# Patient Record
Sex: FEMALE | Race: ASIAN | NOT HISPANIC OR LATINO | Employment: FULL TIME | URBAN - METROPOLITAN AREA
[De-identification: names, ages, dates, MRNs, and addresses within clinical notes are randomized per-mention and may not be internally consistent; named-entity substitution may affect disease eponyms.]

---

## 2022-08-27 ENCOUNTER — APPOINTMENT (OUTPATIENT)
Dept: CT IMAGING | Facility: HOSPITAL | Age: 52
End: 2022-08-27
Attending: EMERGENCY MEDICINE
Payer: COMMERCIAL

## 2022-08-27 ENCOUNTER — HOSPITAL ENCOUNTER (EMERGENCY)
Facility: HOSPITAL | Age: 52
Discharge: HOME | End: 2022-08-27
Attending: EMERGENCY MEDICINE | Admitting: EMERGENCY MEDICINE
Payer: COMMERCIAL

## 2022-08-27 VITALS
HEIGHT: 62 IN | WEIGHT: 150 LBS | BODY MASS INDEX: 27.6 KG/M2 | SYSTOLIC BLOOD PRESSURE: 142 MMHG | DIASTOLIC BLOOD PRESSURE: 87 MMHG | RESPIRATION RATE: 20 BRPM | HEART RATE: 56 BPM | OXYGEN SATURATION: 98 % | TEMPERATURE: 98 F

## 2022-08-27 DIAGNOSIS — R51.9 NONINTRACTABLE HEADACHE, UNSPECIFIED CHRONICITY PATTERN, UNSPECIFIED HEADACHE TYPE: ICD-10-CM

## 2022-08-27 DIAGNOSIS — M62.838 NECK MUSCLE SPASM: ICD-10-CM

## 2022-08-27 LAB
ANION GAP SERPL CALCULATED.3IONS-SCNC: 11 MMOL/L (ref 5–18)
APTT PPP: 25 SECONDS (ref 22–38)
BUN SERPL-MCNC: 17 MG/DL (ref 8–22)
CALCIUM SERPL-MCNC: 9.3 MG/DL (ref 8.5–10.5)
CHLORIDE BLD-SCNC: 110 MMOL/L (ref 98–107)
CO2 SERPL-SCNC: 21 MMOL/L (ref 22–31)
CREAT SERPL-MCNC: 0.75 MG/DL (ref 0.6–1.1)
ERYTHROCYTE [DISTWIDTH] IN BLOOD BY AUTOMATED COUNT: 12.5 % (ref 10–15)
GFR SERPL CREATININE-BSD FRML MDRD: >90 ML/MIN/1.73M2
GLUCOSE BLD-MCNC: 86 MG/DL (ref 70–125)
HCT VFR BLD AUTO: 45 % (ref 35–47)
HGB BLD-MCNC: 15 G/DL (ref 11.7–15.7)
INR PPP: 0.92 (ref 0.85–1.15)
MCH RBC QN AUTO: 31.9 PG (ref 26.5–33)
MCHC RBC AUTO-ENTMCNC: 33.3 G/DL (ref 31.5–36.5)
MCV RBC AUTO: 96 FL (ref 78–100)
PLATELET # BLD AUTO: 218 10E3/UL (ref 150–450)
POTASSIUM BLD-SCNC: 4.1 MMOL/L (ref 3.5–5)
RBC # BLD AUTO: 4.7 10E6/UL (ref 3.8–5.2)
SODIUM SERPL-SCNC: 142 MMOL/L (ref 136–145)
WBC # BLD AUTO: 4.3 10E3/UL (ref 4–11)

## 2022-08-27 PROCEDURE — 85730 THROMBOPLASTIN TIME PARTIAL: CPT | Performed by: EMERGENCY MEDICINE

## 2022-08-27 PROCEDURE — 99285 EMERGENCY DEPT VISIT HI MDM: CPT | Mod: 25

## 2022-08-27 PROCEDURE — 250N000013 HC RX MED GY IP 250 OP 250 PS 637: Performed by: EMERGENCY MEDICINE

## 2022-08-27 PROCEDURE — 70496 CT ANGIOGRAPHY HEAD: CPT

## 2022-08-27 PROCEDURE — 80048 BASIC METABOLIC PNL TOTAL CA: CPT | Performed by: EMERGENCY MEDICINE

## 2022-08-27 PROCEDURE — 36415 COLL VENOUS BLD VENIPUNCTURE: CPT | Performed by: EMERGENCY MEDICINE

## 2022-08-27 PROCEDURE — 85610 PROTHROMBIN TIME: CPT | Performed by: EMERGENCY MEDICINE

## 2022-08-27 PROCEDURE — 96361 HYDRATE IV INFUSION ADD-ON: CPT

## 2022-08-27 PROCEDURE — 258N000003 HC RX IP 258 OP 636: Performed by: EMERGENCY MEDICINE

## 2022-08-27 PROCEDURE — 96375 TX/PRO/DX INJ NEW DRUG ADDON: CPT

## 2022-08-27 PROCEDURE — 70498 CT ANGIOGRAPHY NECK: CPT

## 2022-08-27 PROCEDURE — 96374 THER/PROPH/DIAG INJ IV PUSH: CPT | Mod: 59

## 2022-08-27 PROCEDURE — 85027 COMPLETE CBC AUTOMATED: CPT | Performed by: EMERGENCY MEDICINE

## 2022-08-27 PROCEDURE — 250N000011 HC RX IP 250 OP 636: Performed by: EMERGENCY MEDICINE

## 2022-08-27 RX ORDER — IOPAMIDOL 755 MG/ML
75 INJECTION, SOLUTION INTRAVASCULAR ONCE
Status: COMPLETED | OUTPATIENT
Start: 2022-08-27 | End: 2022-08-27

## 2022-08-27 RX ORDER — ONDANSETRON 4 MG/1
4 TABLET, ORALLY DISINTEGRATING ORAL EVERY 8 HOURS PRN
Qty: 10 TABLET | Refills: 0 | Status: SHIPPED | OUTPATIENT
Start: 2022-08-27

## 2022-08-27 RX ORDER — CYCLOBENZAPRINE HCL 5 MG
5 TABLET ORAL ONCE
Status: COMPLETED | OUTPATIENT
Start: 2022-08-27 | End: 2022-08-27

## 2022-08-27 RX ORDER — CYCLOBENZAPRINE HCL 5 MG
TABLET ORAL
Qty: 20 TABLET | Refills: 0 | Status: SHIPPED | OUTPATIENT
Start: 2022-08-27

## 2022-08-27 RX ORDER — METOCLOPRAMIDE HYDROCHLORIDE 5 MG/ML
10 INJECTION INTRAMUSCULAR; INTRAVENOUS ONCE
Status: COMPLETED | OUTPATIENT
Start: 2022-08-27 | End: 2022-08-27

## 2022-08-27 RX ORDER — MORPHINE SULFATE 4 MG/ML
4 INJECTION, SOLUTION INTRAMUSCULAR; INTRAVENOUS ONCE
Status: COMPLETED | OUTPATIENT
Start: 2022-08-27 | End: 2022-08-27

## 2022-08-27 RX ORDER — DEXAMETHASONE SODIUM PHOSPHATE 10 MG/ML
10 INJECTION, SOLUTION INTRAMUSCULAR; INTRAVENOUS ONCE
Status: COMPLETED | OUTPATIENT
Start: 2022-08-27 | End: 2022-08-27

## 2022-08-27 RX ADMIN — SODIUM CHLORIDE 1000 ML: 9 INJECTION, SOLUTION INTRAVENOUS at 16:19

## 2022-08-27 RX ADMIN — MORPHINE SULFATE 4 MG: 4 INJECTION INTRAVENOUS at 19:59

## 2022-08-27 RX ADMIN — METOCLOPRAMIDE HYDROCHLORIDE 10 MG: 5 INJECTION INTRAMUSCULAR; INTRAVENOUS at 16:19

## 2022-08-27 RX ADMIN — DEXAMETHASONE SODIUM PHOSPHATE 10 MG: 10 INJECTION, SOLUTION INTRAMUSCULAR; INTRAVENOUS at 19:59

## 2022-08-27 RX ADMIN — CYCLOBENZAPRINE HYDROCHLORIDE 5 MG: 5 TABLET, FILM COATED ORAL at 21:20

## 2022-08-27 RX ADMIN — IOPAMIDOL 75 ML: 755 INJECTION, SOLUTION INTRAVENOUS at 19:23

## 2022-08-27 ASSESSMENT — ACTIVITIES OF DAILY LIVING (ADL)
ADLS_ACUITY_SCORE: 35

## 2022-08-27 NOTE — ED NOTES
EMERGENCY DEPARTMENT ENCOUNTER      NAME: Fior Iraheta  AGE: 52 year old female  YOB: 1970  MRN: [id number]  EVALUATION DATE & TIME: 2022  3:50 PM    [facility]    ED PROVIDER: Tahmina Benton M.D.        Chief Complaint   Patient presents with     Headache         FINAL IMPRESSION:    1. Nonintractable headache, unspecified chronicity pattern, unspecified headache type    2. Neck muscle spasm          Patient was signed out to me at change of shift by Dr. Myrick at 6:55 PM. Please see their note for full HPI, exam and plan.    MEDICAL DECISION MAKIN year old female with history of headaches who presents emergency department 3 weeks now persistent left-sided neck pain that radiates up into the base of her skull.  Worse after she use a neck massager machine at home.  Neuro exam normal.  CTA head and neck unremarkable.  On my examination seems to be reproducible in the trapezius muscle location.  Patient agrees to go home and to try muscle relaxant, continue Tylenol and ibuprofen, and follow-up with primary care if not improving in the next couple of days.  Patient understands what to watch for and when to return to the ER and all of her questions have been answered.    Patient did have concerns about possible aneurysm given family history though no aneurysm seen on imaging today.      ED COURSE:  6:55 PM  Patient was signed out to me at change of shift by Dr. Myrick. Please see their note for full HPI, exam and plan.    8:53 PM  I updated patient on results.  Overall she is feeling a little bit better.  States that the pain is more along the actual lateral neck and seems to follow along the trapezius area.  Worse with movement.  No red flags on exam or history.  Neuro exam unremarkable.  CTA head and neck unremarkable.  No neurologic changes and nothing to suggest shooting down her arms to suggest a cervical spinal abnormality.  At this time she  agrees to try muscle relaxant at home, continue with OTC medications, try some topical lidocaine preparations from her pharmacy, and follow-up with family doctor.    No red flags on exam.  Seems more in the distribution of the trapezius muscle and certainly could be irritated from the neck massager she had done.  Plan at this time is to have her continue Tylenol ibuprofen at home but also prescribe muscle relaxant.  I do not think she requires narcotics at this time.  No red flag to suggest subarachnoid hemorrhage, vascular dissection, etc.  At this time I feel is safe for discharge home.  No indication for lumbar puncture.    At this time I do not think that the headache represents CVA, TIA, SAH, glaucoma, temporal arteritis, sinus thrombosis, vascular dissection, pseudotumor cerebri, meningitis, enchephalitis, hypertensive urgency or emergency, or other such etiologies.      COVID-19 PPE worn during patient evaluation:  Mask: n95 and homemade masks   Eye Protection: goggles   Gown: none   Hair cover: yes  Face shield: none   Patient wearing a mask: yes         At the conclusion of the encounter I discussed the results of all of the tests and the disposition. Their questions were answered. The patient (and any family present) acknowledged understanding and were agreeable with the care plan.      CONSULTANTS:  none          MEDICATIONS GIVEN IN THE EMERGENCY:  Medications   0.9% sodium chloride BOLUS (0 mLs Intravenous Stopped 8/27/22 1957)   metoclopramide (REGLAN) injection 10 mg (10 mg Intravenous Given 8/27/22 1619)   morphine (PF) injection 4 mg (4 mg Intravenous Given 8/27/22 1959)   dexamethasone PF (DECADRON) injection 10 mg (10 mg Intravenous Given 8/27/22 1959)   iopamidol (ISOVUE-370) solution 75 mL (75 mLs Intravenous Given 8/27/22 1923)   cyclobenzaprine (FLEXERIL) tablet 5 mg (5 mg Oral Given 8/27/22 2120)             NEW PRESCRIPTIONS STARTED AT TODAY'S ER VISIT     Medication List      Started     cyclobenzaprine 5 MG tablet  Commonly known as: FLEXERIL  1-2 tabs PO q8hr PRN neck spasm/pain. Do not drive.  Do not mix wih alcohol.     ondansetron 4 MG ODT tab  Commonly known as: Zofran ODT  4 mg, Oral, EVERY 8 HOURS PRN                CONDITION:  stable        DISPOSITION:  discharge home with          =================================================================  =================================================================      Patient was signed out to me at change of shift by Dr. Myrick at 6:55 PM. Please see their note for full HPI, exam and plan.        HPI    Fior Iraheta is a 52 year old female with history of migraines, GERD, and bronchiectasis who presents to the ER with complaints of headache.    Patient has a history of headaches, but about 3 weeks ago was noticing a headache and tried a neck massager and has had persistent head and left-sided neck pain ever since.  Patient signed out to me at change of shift awaiting CTA head and neck and disposition.  Overall no concerns for things like subarachnoid hemorrhage.  Less likely to be vascular dissection though CTA to help evaluate this.  Patient signed out awaiting CT scan and anticipated discharge home.      PAST MEDICAL HISTORY:  Past Medical History:   Diagnosis Date     Other headache syndrome          PAST SURGICAL HISTORY:  History reviewed. No pertinent surgical history.      CURRENT MEDICATIONS:    Prior to Admission medications    Not on File         ALLERGIES:  No Known Allergies      FAMILY HISTORY:  History reviewed. No pertinent family history.      SOCIAL HISTORY:  Social History     Socioeconomic History     Marital status:          VITALS:  Patient Vitals for the past 24 hrs:   BP Temp Pulse Resp SpO2 Height Weight   08/27/22 2115 (!) 142/87 -- 56 -- 98 % -- --   08/27/22 2100 (!) 144/86 -- 58 -- 97 % -- --   08/27/22 2045 (!) 151/85 -- 57 -- 99 % -- --   08/27/22 2030 (!) 153/89 -- 56 -- 98 % -- --  "  08/27/22 2015 (!) 155/86 -- 58 -- 97 % -- --   08/27/22 2000 (!) 174/88 -- 55 -- 98 % -- --   08/27/22 1800 (!) 153/85 -- 61 -- 98 % -- --   08/27/22 1745 (!) 158/93 -- 61 -- 99 % -- --   08/27/22 1730 (!) 157/83 -- 62 -- 99 % -- --   08/27/22 1715 133/77 -- 58 -- 98 % -- --   08/27/22 1700 (!) 144/73 -- 58 -- 99 % -- --   08/27/22 1645 138/76 -- 59 -- 98 % -- --   08/27/22 1630 130/77 -- 63 -- 98 % -- --   08/27/22 1626 135/76 -- 57 -- 97 % -- --   08/27/22 1420 138/85 98  F (36.7  C) 66 20 96 % 1.575 m (5' 2\") 68 kg (150 lb)       Wt Readings from Last 3 Encounters:   08/27/22 68 kg (150 lb)         PHYSICAL EXAM    Please see initial providers note for detailed physical exam        LAB:  All pertinent labs reviewed and interpreted.  Recent Results (from the past 24 hour(s))   CBC (+ platelets, no diff)    Collection Time: 08/27/22  4:23 PM   Result Value Ref Range    WBC Count 4.3 4.0 - 11.0 10e3/uL    RBC Count 4.70 3.80 - 5.20 10e6/uL    Hemoglobin 15.0 11.7 - 15.7 g/dL    Hematocrit 45.0 35.0 - 47.0 %    MCV 96 78 - 100 fL    MCH 31.9 26.5 - 33.0 pg    MCHC 33.3 31.5 - 36.5 g/dL    RDW 12.5 10.0 - 15.0 %    Platelet Count 218 150 - 450 10e3/uL   Basic metabolic panel    Collection Time: 08/27/22  4:23 PM   Result Value Ref Range    Sodium 142 136 - 145 mmol/L    Potassium 4.1 3.5 - 5.0 mmol/L    Chloride 110 (H) 98 - 107 mmol/L    Carbon Dioxide (CO2) 21 (L) 22 - 31 mmol/L    Anion Gap 11 5 - 18 mmol/L    Urea Nitrogen 17 8 - 22 mg/dL    Creatinine 0.75 0.60 - 1.10 mg/dL    Calcium 9.3 8.5 - 10.5 mg/dL    Glucose 86 70 - 125 mg/dL    GFR Estimate >90 >60 mL/min/1.73m2   INR    Collection Time: 08/27/22  4:23 PM   Result Value Ref Range    INR 0.92 0.85 - 1.15   PTT    Collection Time: 08/27/22  4:23 PM   Result Value Ref Range    aPTT 25 22 - 38 Seconds       No results found for: [facility]        RADIOLOGY:  Reviewed all pertinent imaging. Please see official radiology report.    CTA Head Neck with Contrast "   Final Result   IMPRESSION:    HEAD CT:   1.  Normal head CT.      HEAD CTA:    1.  Normal CTA [place] [facility] [place].      NECK CTA:   1.  Normal neck CTA.            EKG:    none    PROCEDURES:  None      I, Maria Esther Jenkins, am serving as a scribe to document services personally performed by Dr. Tahmina Benton based on my observation and the provider's statements to me. I, Dr. Tahmina Benton MD attest that Maria Esther Jenkins is acting in a scribe capacity, has observed my performance of the services and has documented them in accordance with my direction.        Tahmina Benton M.D. [facility]  Emergency Medicine and Medical Toxicology  [facility] EMERGENCY DEPARTMENT  [facility] [facility] [id number] [phone]  Dept: [phone]         Tahmina Benton MD  08/28/22 0113

## 2022-08-27 NOTE — DISCHARGE INSTRUCTIONS
Please follow-up with your Primary Care Provider within 3-5 days; call to arrange appointment.      Return to the ER for worsening symptoms, sudden onset or severe headache, focal neurologic deficit (for example, facial droop or right arm weakness), persistent nausea / vomiting, fever or other concerns.     Recommend you continue with the Tylenol and ibuprofen medications, just follow the instruction on those bottles.  Can also buy over-the-counter lidocaine patches (this also will, and ointments, creams, and roll-on's).  You can purchase any of these forms of this medication, whichever you find most convenient for you.    Use the cyclobenzaprine (Flexeril) muscle relaxant as well.  I recommend starting with 1 tablet to see how this works, you can go to 2 tablets if needed and you tolerate it ok.  This medication can be sedating.  Do not take this if you are going to be driving.  It is okay to mix this medication with Tylenol and ibuprofen.    Thank you for choosing [facility] Emergency Department.  It has been our pleasure caring for you today.

## 2022-08-27 NOTE — ED TRIAGE NOTES
"Patient here for headache that she has had for 3 weeks. Headache starts at back of neck and rides up back of head. No trauma. Does have history of headaches, usually with photosensitivity, this one is different. No light sensitivity now.no nausea.  'My 2 brothers  Have had brain aneurysms, I am worried I might have one also\"     Triage Assessment     Row Name 08/27/22 2366       Triage Assessment (Adult)    Airway WDL WDL       Respiratory WDL    Respiratory WDL WDL       Skin Circulation/Temperature WDL    Skin Circulation/Temperature WDL WDL       Cardiac WDL    Cardiac WDL WDL       Peripheral/Neurovascular WDL    Peripheral Neurovascular WDL WDL       Cognitive/Neuro/Behavioral WDL    Cognitive/Neuro/Behavioral WDL WDL              "

## 2022-08-27 NOTE — ED PROVIDER NOTES
"  Emergency Department Encounter     Evaluation Date & Time:   No admission date for patient encounter.    CHIEF COMPLAINT:  Headache      Triage Note:  Patient here for headache that she has had for 3 weeks. Headache starts at back of neck and rides up back of head. No trauma. Does have history of headaches, usually with photosensitivity, this one is different. No light sensitivity now.no nausea.  'My 2 brothers  Have had brain aneurysms, I am worried I might have one also\"     Triage Assessment     Row Name 08/27/22 1422       Triage Assessment (Adult)    Airway WDL WDL       Respiratory WDL    Respiratory WDL WDL       Skin Circulation/Temperature WDL    Skin Circulation/Temperature WDL WDL       Cardiac WDL    Cardiac WDL WDL       Peripheral/Neurovascular WDL    Peripheral Neurovascular WDL WDL       Cognitive/Neuro/Behavioral WDL    Cognitive/Neuro/Behavioral WDL WDL                    Impression and Plan       FINAL IMPRESSION:    ICD-10-CM    1. Nonintractable headache, unspecified chronicity pattern, unspecified headache type  R51.9          ED COURSE & MEDICAL DECISION MAKING:  3:00 PM Initial history and physical performed. Plan of care discussed.     52 year old female, history of headaches, who presents for evaluation of a headache that started ~3 weeks ago. The headache initially felt consistent with her typical headaches, but it has been persistent and her headaches usually last for only 1-2 days. She had used a massager on her neck and has been having pain in her neck radiating to the left side of her head and she is concerned that she injured herself with the massager. She reports intermittent lightheadedness and dizziness due to the pain. No vision changes, extremity weakness / paresthesias / numbness, dysphagia, difficulty walking or speaking. No nausea / vomiting or fevers. She has a FH of cerebral aneurysms and is also worried that her headache could be related to that.    Neuro exam is normal, " however given persistent headache, will pursue imaging. Will include vessels to evaluate for cerebral aneurysm as well as vertebral artery dissection given neck pain after massaging and intermittent dizziness.    Labs unremarkable.     No significant improvement after IV fluids and IV Reglan. Morphine ordered.    Imaging studies pending at time of shift change. Patient signed out to Dr. Benton at time of shift change. If imaging is unremarkable and she is feeling better, discharge anticipated. Patient stable thus far in ED course.      At the conclusion of the encounter I discussed the results of all the tests and the disposition. The questions were answered. The patient acknowledged understanding and was agreeable with the care plan.      MEDICATIONS GIVEN IN THE EMERGENCY DEPARTMENT:  Medications   morphine (PF) injection 4 mg (has no administration in time range)   0.9% sodium chloride BOLUS (1,000 mLs Intravenous New Bag 8/27/22 1619)   metoclopramide (REGLAN) injection 10 mg (10 mg Intravenous Given 8/27/22 1619)       NEW PRESCRIPTIONS STARTED AT TODAY'S ED VISIT:  New Prescriptions    No medications on file       HPI     HPI     Fior Iraheta is a 52 year old female, history of headaches, who presents to this ED by walk in for evaluation of a headache.    Patient reports onset of headache about 3 weeks ago. The headache started somewhat suddenly, which she reports is not atypical for her, and otherwise was consistent with her typical headaches. She massaged her neck with a massage roller for relief and is concerned that she damaged her neck as her headache has been persistent. She states that her headaches normally last about 1-2 days, but this headache has now been present for ~3 weeks. The headache is located to the posterior aspect of her head and radiates to the entire left side of her head. The pain feels like a toothache, but it is in her head. The pain is worse with sitting up and standing and  "improves when she lies down, especially on her left side. The pain also worsens when she palpates the head and neck. She is typically photophobic with her headaches, but denies that currently. Patient has been taking Tylenol and ibuprofen with slight pain relief. She also is concerned that she could have a cerebral aneurysm as she has a family history of such. She reports intermittent lightheadedness and dizziness due to severe pain. She otherwise denies vision changes, extremity weakness / paresthesias / numbness, dysphagia, difficulty walking or speaking. No nausea / vomiting.    She has otherwise been in her usual state of health and denies chest pain, shortness of breath, abdominal pain, nausea, vomiting, diarrhea, cough, fever or any other concerns at this time.    She is post-menopausal.     REVIEW OF SYSTEMS:  All other systems reviewed and are negative.      Medical History     No past medical history on file.    No past surgical history on file.    No family history on file.         No current outpatient medications on file.      Physical Exam     First Vitals:  Patient Vitals for the past 24 hrs:   BP Temp Pulse Resp SpO2 Height Weight   08/27/22 1800 (!) 153/85 -- 61 -- 98 % -- --   08/27/22 1745 (!) 158/93 -- 61 -- 99 % -- --   08/27/22 1730 (!) 157/83 -- 62 -- 99 % -- --   08/27/22 1715 133/77 -- 58 -- 98 % -- --   08/27/22 1700 (!) 144/73 -- 58 -- 99 % -- --   08/27/22 1645 138/76 -- 59 -- 98 % -- --   08/27/22 1630 130/77 -- 63 -- 98 % -- --   08/27/22 1626 135/76 -- 57 -- 97 % -- --   08/27/22 1420 138/85 98  F (36.7  C) 66 20 96 % 1.575 m (5' 2\") 68 kg (150 lb)       PHYSICAL EXAM:   Physical Exam    GENERAL: Awake, alert.  In mild to moderate acute distress due to headache.   HEENT: Normocephalic, atraumatic. Pupils equal, round and reactive. Conjunctiva normal. EOMI without nystagmus. Tongue is midline.  NECK: No meningismus. No stridor.  PULMONARY: Symmetrical breath sounds without distress.  " Lungs clear to auscultation bilaterally without wheezes, rhonchi or rales.  CARDIO: Regular rate and rhythm.  No significant murmur, rub or gallop.  Radial pulses strong and symmetrical.  ABDOMINAL: Abdomen soft, non-distended and non-tender to palpation.    EXTREMITIES: No lower extremity swelling or edema.      NEURO: Alert and oriented to person, place and time.  Cranial nerves III-XII intact.  Strength 5/5 BL upper and lower extremities with sensation to light touch grossly intact. Normal finger-nose-finger testing without dysmetria or overshooting. Normal gait.    PSYCH: Normal mood and affect.  SKIN: No rashes.     Results     LAB:  All pertinent labs reviewed and interpreted  Labs Ordered and Resulted from Time of ED Arrival to Time of ED Departure   BASIC METABOLIC PANEL - Abnormal       Result Value    Sodium 142      Potassium 4.1      Chloride 110 (*)     Carbon Dioxide (CO2) 21 (*)     Anion Gap 11      Urea Nitrogen 17      Creatinine 0.75      Calcium 9.3      Glucose 86      GFR Estimate >90     CBC WITH PLATELETS - Normal    WBC Count 4.3      RBC Count 4.70      Hemoglobin 15.0      Hematocrit 45.0      MCV 96      MCH 31.9      MCHC 33.3      RDW 12.5      Platelet Count 218     INR - Normal    INR 0.92     PARTIAL THROMBOPLASTIN TIME - Normal    aPTT 25         RADIOLOGY:  CTA Head Neck with Contrast    (Results Pending)         I, Roby Shelton, am serving as a scribe to document services personally performed by Aubrie Myrick MD based on my observation and the provider's statements to me. I, Aubrie Myrick MD attest that Roby Shelton is acting in a scribe capacity, has observed my performance of the services and has documented them in accordance with my direction.    Aubrie Myrick MD  Emergency Medicine  [facility] EMERGENCY DEPARTMENT           Aubrie Myrick MD  08/27/22 1853       Aubrie Myrick MD  08/27/22 4807

## 2023-08-27 ENCOUNTER — HEALTH MAINTENANCE LETTER (OUTPATIENT)
Age: 53
End: 2023-08-27

## 2024-10-20 ENCOUNTER — HEALTH MAINTENANCE LETTER (OUTPATIENT)
Age: 54
End: 2024-10-20